# Patient Record
Sex: FEMALE
[De-identification: names, ages, dates, MRNs, and addresses within clinical notes are randomized per-mention and may not be internally consistent; named-entity substitution may affect disease eponyms.]

---

## 2018-12-11 PROBLEM — Z00.00 ENCOUNTER FOR PREVENTIVE HEALTH EXAMINATION: Status: ACTIVE | Noted: 2018-12-11

## 2018-12-13 ENCOUNTER — APPOINTMENT (OUTPATIENT)
Dept: INFECTIOUS DISEASE | Facility: CLINIC | Age: 23
End: 2018-12-13
Payer: MEDICAID

## 2018-12-13 VITALS
TEMPERATURE: 98.4 F | OXYGEN SATURATION: 94 % | SYSTOLIC BLOOD PRESSURE: 136 MMHG | WEIGHT: 188 LBS | HEART RATE: 81 BPM | DIASTOLIC BLOOD PRESSURE: 79 MMHG

## 2018-12-13 DIAGNOSIS — Z72.51 HIGH RISK HETEROSEXUAL BEHAVIOR: ICD-10-CM

## 2018-12-13 PROCEDURE — 99204 OFFICE O/P NEW MOD 45 MIN: CPT

## 2018-12-14 LAB
HIV1 RNA # SERPL NAA+PROBE: NORMAL
HIV1 RNA # SERPL NAA+PROBE: NORMAL COPIES/ML
HIV1+2 AB SPEC QL IA.RAPID: NONREACTIVE
VIRAL LOAD INTERP: NORMAL
VIRAL LOAD LOG: NORMAL LG COP/ML

## 2020-08-17 ENCOUNTER — APPOINTMENT (OUTPATIENT)
Dept: INFECTIOUS DISEASE | Facility: CLINIC | Age: 25
End: 2020-08-17
Payer: COMMERCIAL

## 2020-08-17 VITALS
OXYGEN SATURATION: 100 % | HEART RATE: 85 BPM | SYSTOLIC BLOOD PRESSURE: 99 MMHG | DIASTOLIC BLOOD PRESSURE: 65 MMHG | HEIGHT: 71 IN | WEIGHT: 198 LBS | BODY MASS INDEX: 27.72 KG/M2 | TEMPERATURE: 98.1 F

## 2020-08-17 DIAGNOSIS — Z78.9 OTHER SPECIFIED HEALTH STATUS: ICD-10-CM

## 2020-08-17 DIAGNOSIS — F17.210 NICOTINE DEPENDENCE, CIGARETTES, UNCOMPLICATED: ICD-10-CM

## 2020-08-17 PROCEDURE — 99214 OFFICE O/P EST MOD 30 MIN: CPT

## 2020-08-17 NOTE — PHYSICAL EXAM
[General Appearance - Alert] : alert [General Appearance - In No Acute Distress] : in no acute distress [Sclera] : the sclera and conjunctiva were normal [Neck Appearance] : the appearance of the neck was normal [Outer Ear] : the ears and nose were normal in appearance [Heart Rate And Rhythm] : heart rate was normal and rhythm regular [Heart Sounds] : normal S1 and S2 [Edema] : there was no peripheral edema [Abdomen Soft] : soft [Bowel Sounds] : normal bowel sounds [No Palpable Adenopathy] : no palpable adenopathy [] : no rash [Musculoskeletal - Swelling] : no joint swelling [Motor Exam] : the motor exam was normal [Oriented To Time, Place, And Person] : oriented to person, place, and time [No Focal Deficits] : no focal deficits

## 2020-08-17 NOTE — HISTORY OF PRESENT ILLNESS
[FreeTextEntry1] : 24 year old female with no PMH here with positive HIV Ag/AB screen that was done by her PMD on 7/20.  Reflex antibody and HIV viral load are not available.  Patient states she is sexually active with one male partner x 6 months.  Does not use condoms consistently.  She believes her partner is heterosexual and HIV negative.  Neither she nor her partner uses IV drugs or has had blood transfusions.  She has multiple tattoos but none were recent.  She does not have any fevers, chills, lymphadenopathy, sore throat, rash.\par \par Of note she had a positive HIV ag/ab screen in October 2018  Reflex antibody testing and HIV PCR were negatiive.  She was seen by ID in December 2018 and HIV Ag/Ab as well as HIV viral load were negative at that time.

## 2020-08-18 LAB — HIV1+2 AB SPEC QL IA.RAPID: NONREACTIVE

## 2020-08-19 LAB
HIV1 RNA # SERPL NAA+PROBE: NORMAL
HIV1 RNA # SERPL NAA+PROBE: NORMAL COPIES/ML
VIRAL LOAD INTERP: NORMAL
VIRAL LOAD LOG: NORMAL LG COP/ML

## 2021-04-28 ENCOUNTER — APPOINTMENT (OUTPATIENT)
Dept: INFECTIOUS DISEASE | Facility: CLINIC | Age: 26
End: 2021-04-28
Payer: COMMERCIAL

## 2021-04-28 VITALS
HEART RATE: 81 BPM | BODY MASS INDEX: 29.75 KG/M2 | OXYGEN SATURATION: 98 % | DIASTOLIC BLOOD PRESSURE: 81 MMHG | WEIGHT: 212.5 LBS | TEMPERATURE: 98.3 F | HEIGHT: 71 IN | SYSTOLIC BLOOD PRESSURE: 123 MMHG

## 2021-04-28 PROCEDURE — 99213 OFFICE O/P EST LOW 20 MIN: CPT

## 2021-04-28 PROCEDURE — 99072 ADDL SUPL MATRL&STAF TM PHE: CPT

## 2021-04-28 NOTE — HISTORY OF PRESENT ILLNESS
[FreeTextEntry1] : 25 year old female with no PMH here with positive HIV Ag/AB screen that was done by her PMD. She had negative HIV screen and VL on 12/13/18 and 8/17/20. Patient is sexually active with one male partner x 2 years.  Uses condoms with inconsistent condom use in the past.  She believes her partner is heterosexual and HIV negative.  Neither she nor her partner uses IV drugs or has had blood transfusions.  Dnies fevers, chills, lymphadenopathy, sore throat, rash. She was instructed to come for repeat testing by PCP.

## 2021-04-28 NOTE — PHYSICAL EXAM
[General Appearance - Alert] : alert [Sclera] : the sclera and conjunctiva were normal [Outer Ear] : the ears and nose were normal in appearance [Neck Appearance] : the appearance of the neck was normal [Heart Rate And Rhythm] : heart rate was normal and rhythm regular [Edema] : there was no peripheral edema [Bowel Sounds] : normal bowel sounds [No Palpable Adenopathy] : no palpable adenopathy [Musculoskeletal - Swelling] : no joint swelling [] : no rash [Motor Exam] : the motor exam was normal [No Focal Deficits] : no focal deficits [Oriented To Time, Place, And Person] : oriented to person, place, and time

## 2021-04-29 LAB — HIV1+2 AB SPEC QL IA.RAPID: NONREACTIVE

## 2023-10-05 ENCOUNTER — APPOINTMENT (OUTPATIENT)
Dept: INFECTIOUS DISEASE | Facility: CLINIC | Age: 28
End: 2023-10-05
Payer: MEDICAID

## 2023-10-05 VITALS
TEMPERATURE: 98.6 F | DIASTOLIC BLOOD PRESSURE: 67 MMHG | HEART RATE: 77 BPM | WEIGHT: 203.38 LBS | HEIGHT: 71 IN | BODY MASS INDEX: 28.47 KG/M2 | SYSTOLIC BLOOD PRESSURE: 108 MMHG | OXYGEN SATURATION: 98 %

## 2023-10-05 PROCEDURE — 99213 OFFICE O/P EST LOW 20 MIN: CPT

## 2023-10-06 LAB
C TRACH RRNA SPEC QL NAA+PROBE: NOT DETECTED
C TRACH RRNA SPEC QL NAA+PROBE: NOT DETECTED
HIV1 RNA # SERPL NAA+PROBE: NORMAL
HIV1 RNA # SERPL NAA+PROBE: NORMAL COPIES/ML
HIV1+2 AB SPEC QL IA.RAPID: NONREACTIVE
N GONORRHOEA RRNA SPEC QL NAA+PROBE: NOT DETECTED
N GONORRHOEA RRNA SPEC QL NAA+PROBE: NOT DETECTED
SOURCE AMPLIFICATION: NORMAL
SOURCE ORAL: NORMAL
T PALLIDUM AB SER QL IA: NEGATIVE
VIRAL LOAD INTERP: NORMAL
VIRAL LOAD LOG: NORMAL LG COP/ML

## 2024-01-25 ENCOUNTER — NON-APPOINTMENT (OUTPATIENT)
Age: 29
End: 2024-01-25

## 2024-01-25 ENCOUNTER — APPOINTMENT (OUTPATIENT)
Dept: INFECTIOUS DISEASE | Facility: CLINIC | Age: 29
End: 2024-01-25
Payer: MEDICAID

## 2024-01-25 VITALS
BODY MASS INDEX: 29.4 KG/M2 | HEART RATE: 66 BPM | HEIGHT: 71 IN | OXYGEN SATURATION: 97 % | TEMPERATURE: 98 F | DIASTOLIC BLOOD PRESSURE: 69 MMHG | SYSTOLIC BLOOD PRESSURE: 120 MMHG | WEIGHT: 210 LBS

## 2024-01-25 DIAGNOSIS — G44.219 EPISODIC TENSION-TYPE HEADACHE, NOT INTRACTABLE: ICD-10-CM

## 2024-01-25 DIAGNOSIS — Z21 ASYMPTOMATIC HUMAN IMMUNODEFICIENCY VIRUS [HIV] INFECTION STATUS: ICD-10-CM

## 2024-01-25 PROCEDURE — 99213 OFFICE O/P EST LOW 20 MIN: CPT

## 2024-01-26 LAB
ALBUMIN SERPL ELPH-MCNC: 4 G/DL
ALP BLD-CCNC: 67 U/L
ALT SERPL-CCNC: 20 U/L
ANION GAP SERPL CALC-SCNC: 9 MMOL/L
AST SERPL-CCNC: 17 U/L
BASOPHILS # BLD AUTO: 0.05 K/UL
BASOPHILS NFR BLD AUTO: 0.9 %
BILIRUB SERPL-MCNC: 0.2 MG/DL
BUN SERPL-MCNC: 12 MG/DL
C TRACH RRNA SPEC QL NAA+PROBE: NOT DETECTED
C TRACH RRNA SPEC QL NAA+PROBE: NOT DETECTED
CALCIUM SERPL-MCNC: 9.4 MG/DL
CHLORIDE SERPL-SCNC: 104 MMOL/L
CO2 SERPL-SCNC: 26 MMOL/L
CREAT SERPL-MCNC: 0.61 MG/DL
EGFR: 123 ML/MIN/1.73M2
EOSINOPHIL # BLD AUTO: 0.15 K/UL
EOSINOPHIL NFR BLD AUTO: 2.8 %
FLUAV SPEC QL CULT: NEGATIVE
FLUBV AG SPEC QL IA: NEGATIVE
GLUCOSE SERPL-MCNC: 84 MG/DL
HCG UR QL: NEGATIVE
HCT VFR BLD CALC: 42.9 %
HCV AB SER QL: NONREACTIVE
HCV S/CO RATIO: 0.11 S/CO
HGB BLD-MCNC: 13.6 G/DL
HIV1 RNA # SERPL NAA+PROBE: NORMAL
HIV1 RNA # SERPL NAA+PROBE: NORMAL COPIES/ML
HIV1+2 AB SPEC QL IA.RAPID: NONREACTIVE
IMM GRANULOCYTES NFR BLD AUTO: 0 %
LYMPHOCYTES # BLD AUTO: 2.46 K/UL
LYMPHOCYTES NFR BLD AUTO: 46.2 %
MAN DIFF?: NORMAL
MCHC RBC-ENTMCNC: 30.2 PG
MCHC RBC-ENTMCNC: 31.7 GM/DL
MCV RBC AUTO: 95.3 FL
MONOCYTES # BLD AUTO: 0.39 K/UL
MONOCYTES NFR BLD AUTO: 7.3 %
N GONORRHOEA RRNA SPEC QL NAA+PROBE: NOT DETECTED
N GONORRHOEA RRNA SPEC QL NAA+PROBE: NOT DETECTED
NEUTROPHILS # BLD AUTO: 2.28 K/UL
NEUTROPHILS NFR BLD AUTO: 42.8 %
PLATELET # BLD AUTO: 226 K/UL
POTASSIUM SERPL-SCNC: 4.2 MMOL/L
PROT SERPL-MCNC: 6.7 G/DL
QUALITY CONTROL: NORMAL
RBC # BLD: 4.5 M/UL
RBC # FLD: 13.6 %
SARS-COV-2 AG RESP QL IA.RAPID: NEGATIVE
SODIUM SERPL-SCNC: 139 MMOL/L
SOURCE AMPLIFICATION: NORMAL
SOURCE ORAL: NORMAL
T PALLIDUM AB SER QL IA: NEGATIVE
VIRAL LOAD INTERP: NORMAL
VIRAL LOAD LOG: NORMAL LG COP/ML
WBC # FLD AUTO: 5.33 K/UL

## 2024-08-01 ENCOUNTER — APPOINTMENT (OUTPATIENT)
Dept: INFECTIOUS DISEASE | Facility: CLINIC | Age: 29
End: 2024-08-01
Payer: MEDICAID

## 2024-08-01 VITALS
SYSTOLIC BLOOD PRESSURE: 106 MMHG | OXYGEN SATURATION: 97 % | DIASTOLIC BLOOD PRESSURE: 74 MMHG | TEMPERATURE: 98.3 F | HEART RATE: 77 BPM

## 2024-08-01 DIAGNOSIS — Z11.3 ENCOUNTER FOR SCREENING FOR INFECTIONS WITH A PREDOMINANTLY SEXUAL MODE OF TRANSMISSION: ICD-10-CM

## 2024-08-01 DIAGNOSIS — Z20.2 CONTACT WITH AND (SUSPECTED) EXPOSURE TO INFECTIONS WITH A PREDOMINANTLY SEXUAL MODE OF TRANSMISSION: ICD-10-CM

## 2024-08-01 PROCEDURE — 99213 OFFICE O/P EST LOW 20 MIN: CPT

## 2024-08-01 NOTE — PHYSICAL EXAM
[General Appearance - Alert] : alert [Sclera] : the sclera and conjunctiva were normal [Outer Ear] : the ears and nose were normal in appearance [Heart Rate And Rhythm] : heart rate was normal and rhythm regular [Edema] : there was no peripheral edema [External Female Genitalia] : normal external genitalia [No Palpable Adenopathy] : no palpable adenopathy [Musculoskeletal - Swelling] : no joint swelling [] : no rash [Motor Exam] : the motor exam was normal [Oriented To Time, Place, And Person] : oriented to person, place, and time

## 2024-08-01 NOTE — HISTORY OF PRESENT ILLNESS
[FreeTextEntry1] : 28 year old female here for evaluation.  Had sex with her male partner of one year last week and the condom broke.  She always uses condoms.  Wishes to be tested for STIs.  To her knowledge her partner does not have any STI.

## 2024-08-05 LAB
C TRACH RRNA SPEC QL NAA+PROBE: NOT DETECTED
HCV AB SER QL: NONREACTIVE
HCV S/CO RATIO: 0.12 S/CO
HIV1 RNA # SERPL NAA+PROBE: NORMAL
HIV1 RNA # SERPL NAA+PROBE: NORMAL COPIES/ML
HIV1+2 AB SPEC QL IA.RAPID: NONREACTIVE
N GONORRHOEA RRNA SPEC QL NAA+PROBE: NOT DETECTED
SOURCE AMPLIFICATION: NORMAL
T PALLIDUM AB SER QL IA: NEGATIVE
VIRAL LOAD INTERP: NORMAL
VIRAL LOAD LOG: NORMAL LG COP/ML

## 2024-08-06 LAB
C TRACH RRNA SPEC QL NAA+PROBE: NOT DETECTED
N GONORRHOEA RRNA SPEC QL NAA+PROBE: NOT DETECTED
SOURCE ORAL: NORMAL

## 2024-10-21 ENCOUNTER — APPOINTMENT (OUTPATIENT)
Dept: INFECTIOUS DISEASE | Facility: CLINIC | Age: 29
End: 2024-10-21
Payer: MEDICAID

## 2024-10-21 ENCOUNTER — NON-APPOINTMENT (OUTPATIENT)
Age: 29
End: 2024-10-21

## 2024-10-21 VITALS
SYSTOLIC BLOOD PRESSURE: 124 MMHG | HEIGHT: 71 IN | HEART RATE: 75 BPM | TEMPERATURE: 98.1 F | BODY MASS INDEX: 26.6 KG/M2 | WEIGHT: 190 LBS | DIASTOLIC BLOOD PRESSURE: 64 MMHG

## 2024-10-21 DIAGNOSIS — Z20.2 CONTACT WITH AND (SUSPECTED) EXPOSURE TO INFECTIONS WITH A PREDOMINANTLY SEXUAL MODE OF TRANSMISSION: ICD-10-CM

## 2024-10-21 PROCEDURE — 99213 OFFICE O/P EST LOW 20 MIN: CPT

## 2024-10-23 LAB
C TRACH RRNA SPEC QL NAA+PROBE: NOT DETECTED
C TRACH RRNA SPEC QL NAA+PROBE: NOT DETECTED
HCV AB SER QL: NONREACTIVE
HCV S/CO RATIO: 0.12 S/CO
N GONORRHOEA RRNA SPEC QL NAA+PROBE: NOT DETECTED
N GONORRHOEA RRNA SPEC QL NAA+PROBE: NOT DETECTED
SOURCE AMPLIFICATION: NORMAL
SOURCE ORAL: NORMAL
T PALLIDUM AB SER QL IA: NEGATIVE

## 2025-04-15 ENCOUNTER — APPOINTMENT (OUTPATIENT)
Dept: INFECTIOUS DISEASE | Facility: CLINIC | Age: 30
End: 2025-04-15
Payer: MEDICAID

## 2025-04-15 VITALS
BODY MASS INDEX: 26.6 KG/M2 | TEMPERATURE: 97.8 F | HEIGHT: 71 IN | WEIGHT: 190 LBS | OXYGEN SATURATION: 98 % | DIASTOLIC BLOOD PRESSURE: 83 MMHG | HEART RATE: 77 BPM | SYSTOLIC BLOOD PRESSURE: 126 MMHG

## 2025-04-15 DIAGNOSIS — Z20.2 CONTACT WITH AND (SUSPECTED) EXPOSURE TO INFECTIONS WITH A PREDOMINANTLY SEXUAL MODE OF TRANSMISSION: ICD-10-CM

## 2025-04-15 PROCEDURE — 99213 OFFICE O/P EST LOW 20 MIN: CPT

## 2025-04-16 LAB
C TRACH RRNA SPEC QL NAA+PROBE: NOT DETECTED
HIV1+2 AB SPEC QL IA.RAPID: NONREACTIVE
N GONORRHOEA RRNA SPEC QL NAA+PROBE: NOT DETECTED
SOURCE ORAL: NORMAL
T PALLIDUM AB SER QL IA: NEGATIVE

## 2025-04-17 ENCOUNTER — NON-APPOINTMENT (OUTPATIENT)
Age: 30
End: 2025-04-17

## 2025-04-17 LAB
C TRACH RRNA SPEC QL NAA+PROBE: NOT DETECTED
HCV AB SER QL: NONREACTIVE
HCV S/CO RATIO: 0.14 S/CO
HIV1 RNA # SERPL NAA+PROBE: NORMAL
HIV1 RNA # SERPL NAA+PROBE: NORMAL COPIES/ML
N GONORRHOEA RRNA SPEC QL NAA+PROBE: NOT DETECTED
SOURCE AMPLIFICATION: NORMAL
VIRAL LOAD INTERP: NORMAL
VIRAL LOAD LOG: NORMAL LG COP/ML